# Patient Record
Sex: FEMALE | Race: ASIAN | NOT HISPANIC OR LATINO | Employment: UNEMPLOYED
[De-identification: names, ages, dates, MRNs, and addresses within clinical notes are randomized per-mention and may not be internally consistent; named-entity substitution may affect disease eponyms.]

---

## 2019-05-14 ENCOUNTER — RECORDS - HEALTHEAST (OUTPATIENT)
Dept: ADMINISTRATIVE | Facility: OTHER | Age: 54
End: 2019-05-14

## 2019-05-15 ENCOUNTER — RECORDS - HEALTHEAST (OUTPATIENT)
Dept: ADMINISTRATIVE | Facility: OTHER | Age: 54
End: 2019-05-15

## 2019-05-17 ENCOUNTER — COMMUNICATION - HEALTHEAST (OUTPATIENT)
Dept: ONCOLOGY | Facility: CLINIC | Age: 54
End: 2019-05-17

## 2019-06-06 ENCOUNTER — COMMUNICATION - HEALTHEAST (OUTPATIENT)
Dept: ONCOLOGY | Facility: HOSPITAL | Age: 54
End: 2019-06-06

## 2019-06-06 ENCOUNTER — AMBULATORY - HEALTHEAST (OUTPATIENT)
Dept: INFUSION THERAPY | Facility: HOSPITAL | Age: 54
End: 2019-06-06

## 2019-06-06 ENCOUNTER — OFFICE VISIT - HEALTHEAST (OUTPATIENT)
Dept: ONCOLOGY | Facility: HOSPITAL | Age: 54
End: 2019-06-06

## 2019-06-06 DIAGNOSIS — D69.6 THROMBOCYTOPENIA (H): ICD-10-CM

## 2019-06-06 LAB
ALBUMIN SERPL-MCNC: 4.3 G/DL (ref 3.5–5)
ALP SERPL-CCNC: 90 U/L (ref 45–120)
ALT SERPL W P-5'-P-CCNC: 38 U/L (ref 0–45)
ANION GAP SERPL CALCULATED.3IONS-SCNC: 6 MMOL/L (ref 5–18)
AST SERPL W P-5'-P-CCNC: 28 U/L (ref 0–40)
BASOPHILS # BLD AUTO: 0 THOU/UL (ref 0–0.2)
BASOPHILS NFR BLD AUTO: 0 % (ref 0–2)
BILIRUB SERPL-MCNC: 0.9 MG/DL (ref 0–1)
BUN SERPL-MCNC: 10 MG/DL (ref 8–22)
CALCIUM SERPL-MCNC: 9.5 MG/DL (ref 8.5–10.5)
CHLORIDE BLD-SCNC: 107 MMOL/L (ref 98–107)
CO2 SERPL-SCNC: 30 MMOL/L (ref 22–31)
CREAT SERPL-MCNC: 0.84 MG/DL (ref 0.6–1.1)
EOSINOPHIL # BLD AUTO: 0 THOU/UL (ref 0–0.4)
EOSINOPHIL NFR BLD AUTO: 0 % (ref 0–6)
ERYTHROCYTE [DISTWIDTH] IN BLOOD BY AUTOMATED COUNT: 11.9 % (ref 11–14.5)
GFR SERPL CREATININE-BSD FRML MDRD: >60 ML/MIN/1.73M2
GLUCOSE BLD-MCNC: 108 MG/DL (ref 70–125)
HCT VFR BLD AUTO: 44.8 % (ref 35–47)
HGB BLD-MCNC: 15 G/DL (ref 12–16)
LDH SERPL L TO P-CCNC: 233 U/L (ref 125–220)
LYMPHOCYTES # BLD AUTO: 1.4 THOU/UL (ref 0.8–4.4)
LYMPHOCYTES NFR BLD AUTO: 30 % (ref 20–40)
MCH RBC QN AUTO: 30.1 PG (ref 27–34)
MCHC RBC AUTO-ENTMCNC: 33.5 G/DL (ref 32–36)
MCV RBC AUTO: 90 FL (ref 80–100)
MONOCYTES # BLD AUTO: 0.4 THOU/UL (ref 0–0.9)
MONOCYTES NFR BLD AUTO: 9 % (ref 2–10)
NEUTROPHILS # BLD AUTO: 2.8 THOU/UL (ref 2–7.7)
NEUTROPHILS NFR BLD AUTO: 60 % (ref 50–70)
PLATELET # BLD AUTO: 110 THOU/UL (ref 140–440)
PMV BLD AUTO: 12 FL (ref 8.5–12.5)
POTASSIUM BLD-SCNC: 4.4 MMOL/L (ref 3.5–5)
PROT SERPL-MCNC: 7.6 G/DL (ref 6–8)
RBC # BLD AUTO: 4.99 MILL/UL (ref 3.8–5.4)
SODIUM SERPL-SCNC: 143 MMOL/L (ref 136–145)
WBC: 4.6 THOU/UL (ref 4–11)

## 2019-06-06 RX ORDER — LEVOTHYROXINE SODIUM 50 UG/1
50 TABLET ORAL DAILY
Refills: 3 | Status: SHIPPED | COMMUNITY
Start: 2019-05-15

## 2019-06-06 ASSESSMENT — MIFFLIN-ST. JEOR: SCORE: 964.67

## 2020-07-27 ENCOUNTER — COMMUNICATION - HEALTHEAST (OUTPATIENT)
Dept: ADMINISTRATIVE | Facility: HOSPITAL | Age: 55
End: 2020-07-27

## 2021-05-28 NOTE — TELEPHONE ENCOUNTER
Using the HE Language Line, phone , I reached Alexa to help schedule a heme consult. I have scheduled her to come see Dr. Lynn on Thursday, 6-6-19 at 1300/1230  check in. I have explained what to expect at the appt. I have notified the referring provider of the appt date and time and provider. I will mail pt a welcome letter with appt details as well as HH form to complete and medication/ allergy list to review and update. I have instructed her to bring these to the appointment.      I have given Yerr my contact information and invited calls.

## 2021-05-29 NOTE — CONSULTS
Consults   [facility] Hematology and Oncology Consult Note    Patient: Alexa Brown  MRN: [id number]  Date of Service: 06/06/2019        Reason for Visit     1. Thrombocytopenia (H)  Comprehensive Metabolic Panel    HM1(CBC and Differential)    Lactate Dehydrogenase (LDH)    Comprehensive Metabolic Panel    Lactate Dehydrogenase (LDH)    HM1(CBC and Differential)    HM1 (CBC with Diff)         Assessment     1.  Incidentally diagnosed thrombocytopenia.  Etiology unclear.  We did repeat her platelet count today and it is up 110,000.  Again her total white count and hemoglobin are completely normal.  Her mean platelet volume is also normal.  We did check her LDH and that was normal as well.  Liver function tests also normal.  This indicates most likely that this is probably benign thrombocytopenia.  Other possibilities include mild ITP type of a syndrome.  2.  Currently patient is completely symptom medic.  3.  Otherwise good general health.  4.  No family history of any worrisome condition.  5.  Language barrier.    Plan     1.  At this time I do not think this patient needs to be evaluated for any other condition.  I did educate the patient and her son about what to look for as for his platelet disorder is concerned.  2.  Check platelets if clinically indicated.  3.  Follow-up with me on as-needed basis.  4.  Continue good diet and exercise.    Staging History     Cancer Staging  No matching staging information was found for the patient.    History     Patient is a very pleasant 54-year-old woman who has been referred to me by RODOLFO Watters for evaluation of thrombocytopenia.  The patient presented to her primary care clinic in the [facility] for thyroid checkup etc.  She had a CBC done on May 14, 2019.  Her platelet count was noted to be 94,000.  This was associate with normal white count and fairly normal differential.  Her hemoglobin was also normal.The only lab that was abnormal was a TSH level of 6.89.   Her hemoglobin was 15.1.  Normal white count of 4.09.  In differential also completely normal.  Patient did not have any symptoms.  No bleeding no ecchymosis.  No fever or chills.  The patient feels fine.  No weight loss etc.    The patient was referred here for further evaluation.  The patient otherwise a pretty healthy individual.    Past Medical History     History reviewed. No pertinent past medical history.    Past Social History     Social History     Socioeconomic History     Marital status:      Spouse name: Not on file     Number of children: Not on file     Years of education: Not on file     Highest education level: Not on file   Occupational History     Not on file   Social Needs     Financial resource strain: Not on file     Food insecurity:     Worry: Not on file     Inability: Not on file     Transportation needs:     Medical: Not on file     Non-medical: Not on file   Tobacco Use     Smoking status: Never Smoker     Smokeless tobacco: Never Used   Substance and Sexual Activity     Alcohol use: Never     Frequency: Never     Drug use: Never     Sexual activity: Never   Lifestyle     Physical activity:     Days per week: Not on file     Minutes per session: Not on file     Stress: Not on file   Relationships     Social connections:     Talks on phone: Not on file     Gets together: Not on file     Attends Jewish service: Not on file     Active member of club or organization: Not on file     Attends meetings of clubs or organizations: Not on file     Relationship status: Not on file     Intimate partner violence:     Fear of current or ex partner: Not on file     Emotionally abused: Not on file     Physically abused: Not on file     Forced sexual activity: Not on file   Other Topics Concern     Not on file   Social History Narrative     Not on file       Family History     History reviewed. No pertinent family history.    Medication List     Prior to Admission medications    Medication Sig  "Start Date End Date Taking? Authorizing Provider   levothyroxine (SYNTHROID, LEVOTHROID) 50 MCG tablet Take 50 mcg by mouth daily. 5/15/19  Yes PROVIDER, HISTORICAL       Allergies     No Known Allergies    Review of Systems   A comprehensive review of 14 systems was done. Pertinent findings noted here and in history of present illness. All the rest negative.     General  General (WDL): All general elements are within defined limits  ENT  ENT (WDL): All ENT elements are within defined limits  Respiratory  Respiratory (WDL): All respiratory elements are within defined limits  Cardiovascular  Cardiovascular (WDL): All cardiovascular elements are within defined limits  Endocrine  Endocrine (WDL): All endocrine elements are within defined limits  Gastrointestinal  Gastrointestinal (WDL): All gastrointestinal elements are within defined limits  Musculoskeletal  Musculoskeletal (WDL): Exceptions to WDL  Muscle pain or stiffness: Yes - Recent (Less than 3 months)  Neurological  Neurological (WDL): All neurological elements are within defined limits  Psychological/Emotional  Psychological/Emotional (WDL): All psychological/emotional elements are within defined limits  Hematological/Lymphatic  Hematological/Lymphatic (WDL): All hematological/lymphatic elements are within defined limits  Dermatological  Dermatologic (WDL): All dermatological elements are within defined limits  Genitourinary/Reproductive  Genitourinary/Reproductive (WDL): All genitourinary/reproductive elements are within defined limits  Reproductive (Females only)  Age at start of periods: 18  Last menstural cycle: 1/3/2013  Number of pregnancies: 5  Age at first pregnancy: 20  Patient Pregnant?: No  Is the patient trying to get pregnant?: No  Is the patient on birth control: No  Pain  Currently in Pain: No/denies    Physical Exam     Recent Vitals 6/6/2019   Height 4' 8\"   Weight 111 lbs 11 oz   BSA (m2) 1.42 m2   /83   Pulse 83   SpO2 99 "       Constitutional: Oriented to person, place, and time and appears well-developed.   HEENT:  Normocephalic and atraumatic.  Eyes: Conjunctivae and EOM are normal. Pupils are equal, round, and reactive to light. No discharge.  No scleral icterus. Nose normal. Mouth/Throat: Oropharynx is clear and moist. No oropharyngeal exudate.    NECK: Normal range of motion. Neck supple. No JVD present. No tracheal deviation present. No thyromegaly present.   CARDIOVASCULAR: Normal rate, regular rhythm and intact distal pulses.  Exam reveals no gallop and no friction rub.  Systolic murmur present.  PULMONARY: Effort normal and breath sounds normal. No respiratory distress.No Wheezing or rales.  ABDOMEN: Soft. Bowel sounds are normal. No distension and no mass.  There is no tenderness. There is no rebound and no guarding. No HSM.  MUSCULOSKELETAL: Normal range of motion. No edema and no tenderness. Mild kyphosis, no tenderness.  LYMPH NODES: Has no cervical, supraclavicular, axillary and groin adenopathy.   NEUROLOGICAL: Alert and oriented to person, place, and time. No cranial nerve deficit.  Normal muscle tone. Coordination normal.   GENITOURINARY: Deferred exam.  SKIN: Skin is warm and dry. No rash noted. No erythema. No pallor.   EXTREMITIES: No cyanosis, no clubbing, no edema. No Deformity.  PSYCHIATRIC: Normal mood, affect and behavior.      Lab Results       Results for orders placed or performed in visit on 06/06/19   Comprehensive Metabolic Panel   Result Value Ref Range    Sodium 143 136 - 145 mmol/L    Potassium 4.4 3.5 - 5.0 mmol/L    Chloride 107 98 - 107 mmol/L    CO2 30 22 - 31 mmol/L    Anion Gap, Calculation 6 5 - 18 mmol/L    Glucose 108 70 - 125 mg/dL    BUN 10 8 - 22 mg/dL    Creatinine 0.84 0.60 - 1.10 mg/dL    GFR MDRD Af Amer >60 >60 mL/min/1.73m2    GFR MDRD Non Af Amer >60 >60 mL/min/1.73m2    Bilirubin, Total 0.9 0.0 - 1.0 mg/dL    Calcium 9.5 8.5 - 10.5 mg/dL    Protein, Total 7.6 6.0 - 8.0 g/dL     Albumin 4.3 3.5 - 5.0 g/dL    Alkaline Phosphatase 90 45 - 120 U/L    AST 28 0 - 40 U/L    ALT 38 0 - 45 U/L   Lactate Dehydrogenase (LDH)   Result Value Ref Range    LD (LDH) 233 (H) 125 - 220 U/L   HM1 (CBC with Diff)   Result Value Ref Range    WBC 4.6 4.0 - 11.0 thou/uL    RBC 4.99 3.80 - 5.40 mill/uL    Hemoglobin 15.0 12.0 - 16.0 g/dL    Hematocrit 44.8 35.0 - 47.0 %    MCV 90 80 - 100 fL    MCH 30.1 27.0 - 34.0 pg    MCHC 33.5 32.0 - 36.0 g/dL    RDW 11.9 11.0 - 14.5 %    Platelets 110 (L) 140 - 440 thou/uL    MPV 12.0 8.5 - 12.5 fL    Neutrophils % 60 50 - 70 %    Lymphocytes % 30 20 - 40 %    Monocytes % 9 2 - 10 %    Eosinophils % 0 0 - 6 %    Basophils % 0 0 - 2 %    Neutrophils Absolute 2.8 2.0 - 7.7 thou/uL    Lymphocytes Absolute 1.4 0.8 - 4.4 thou/uL    Monocytes Absolute 0.4 0.0 - 0.9 thou/uL    Eosinophils Absolute 0.0 0.0 - 0.4 thou/uL    Basophils Absolute 0.0 0.0 - 0.2 thou/uL           Imaging Results     No results found.        Total time spent was 60 minutes, more than half of it was in face-to-face counseling regarding disease state, review of available images, laboratory values, pathological reports, treatment, options, their side effects and management.    Claudio Lynn MD

## 2021-05-29 NOTE — TELEPHONE ENCOUNTER
LM on the patient's son's VM as requested by patient to let him know his mother's labs were good per Dr. Lynn and follow up will be in a year.

## 2021-06-03 VITALS — HEIGHT: 56 IN | WEIGHT: 111.7 LBS | BODY MASS INDEX: 25.13 KG/M2

## 2021-06-16 PROBLEM — D69.6 THROMBOCYTOPENIA (H): Status: ACTIVE | Noted: 2019-06-06

## 2021-06-19 NOTE — LETTER
Letter by Lombardi, Susan L, RN at      Author: Lombardi, Susan L, RN Service: -- Author Type: --    Filed:  Encounter Date: 5/17/2019 Status: (Other)       [employer]    Ua tsaug uas koj tau xaiv [facility] los ua lub chaw saib xyuas txog koj bety noj qab nyob zoo. Peb muaj lub siab yuav pab ua kom koj tau txais txoj bety khomob kom zoo thiab raws li koj siab nyiam. Cov mann nram qab no yog qhia txog qhov koj yuav tuaj ntsib peb zaum xub thawj hauv peb lub clinic.    Hnub ilda thiab lub Sijhawm teem: Thursday, 6-6-19 at 1:00 pm, check in at 12:30 pm.    Mann ceeb toom: Thov tuaj 30 feeb ua ntej lub sijhawm teem.  Li no thiaj li muaj sijhawm ua ntaub ntawv, flaca zaum kuj yuav tau love ntshav lossis nrog tus nurse shawna ua ntej.    Koj tus Kws khomob lub npe: Claudio Lynn MD    Yuav tau nqa dabtsi tuaj nrog:      Cov ntaub ntawv uas ua tiav nug txog koj bety mob nkeeg yav tag los/Daim ntawv koj mus nrog tus nurse shawna zaum xub thawj thiab Daim ntawv kathleen cov npe tshuaj (medications)/Daim ntawv kathleen cov tshuaj koj faj/phiv txhua jaime (allergy) (cov ntaub ntawv no twb tau xa tuaj sam koj lawm).    Txhua yam ntaub ntawv lossis cov duab los ntawm koj tus kws khomob tuaj kom tag tibsis li peb tau hais tseg.     Koj daim ntawv khomob (insurance cards)    Chaw nres tsheb (Parking)      Thov saib daim ntawv qhia bety uas xa tuaj nrog no. [facility] nyob sam sab [place] ntaw lub [facility]sev [facility] [place]    Yog koj tuaj txoj Raji tuaj no colby thaum txog txoj [facility] colby koj rex sab xis colby thaum txog thawj daim paib stop colby koj rex sab xis ntxiv thiab.  Thawj lub chaw nres tsheb nyob sam sab laug ntawm yog peb lub chaw nres tshed sam cov neeg mob Cancer nres (Lot D).     Thov nkag [facility] uas yog lub qhov rooj nyob sam sab North ntawm lub tsev khomob [facility].  Koj yuav pom peb daim paib lo sam ntawm lub tsev ib sab.    Peb vam tias txhua yam qhia no yuav pab tau  alison. Saúl charles mann nug lossis txhawj txog dabtsi, thov hu sam peb tus xov [place] [phone].  Nws yog peb ib qhov bety zoo siab uas peb tau johnny rosas.    Ua tsaug, Susan L Lombardi  Nurse Navigator  [phone]